# Patient Record
Sex: FEMALE | Race: WHITE | NOT HISPANIC OR LATINO
[De-identification: names, ages, dates, MRNs, and addresses within clinical notes are randomized per-mention and may not be internally consistent; named-entity substitution may affect disease eponyms.]

---

## 2020-09-23 PROBLEM — Z00.00 ENCOUNTER FOR PREVENTIVE HEALTH EXAMINATION: Status: ACTIVE | Noted: 2020-09-23

## 2020-10-07 ENCOUNTER — TRANSCRIPTION ENCOUNTER (OUTPATIENT)
Age: 30
End: 2020-10-07

## 2020-10-07 ENCOUNTER — APPOINTMENT (OUTPATIENT)
Dept: PLASTIC SURGERY | Facility: CLINIC | Age: 30
End: 2020-10-07
Payer: COMMERCIAL

## 2020-10-07 PROCEDURE — 99203 OFFICE O/P NEW LOW 30 MIN: CPT | Mod: 95

## 2020-10-12 NOTE — HISTORY OF PRESENT ILLNESS
[FreeTextEntry1] : 28 y/o nonbinary person DFaB (they/them, Jaclyn) presents for consultation for top surgery (subtotal mastestomy). They are interested in having a masculine chest contour. They do not plan to take any hormone treatments. They have not noticed any lumps or bumps ion the tissue. They have ____ family hx of breast cancer. They had an ultrasound of the breasts 10 years ago. Nipple sensation is not important to them. They have no plans to ever breast or chest feed.\par \par PMH: anxiety\par PSH: denies\par All: NKDA\par Meds: MVI, lorazepam prnm\par Soc: They work as a  for [employer]. They live with their partner, aCry, who will be available to help them after surgery. They quit smoking 10 years ago; some alcohol use. Denies recreationbal drug use.\par FH: parents alive, healthy

## 2020-10-12 NOTE — REVIEW OF SYSTEMS
[Anxiety] : anxiety [Depression] : depression [Negative] : Heme/Lymph [FreeTextEntry6] : h [FreeTextEntry7] : stomach issues, had colonoscopy 2018 and cleared

## 2020-10-12 NOTE — ASSESSMENT
[FreeTextEntry1] : Interested in DIFNG.\par Needs an in-person appt for exam and discussion.\par Review FH of breast cancer.\par Will obtain letter Friday.

## 2020-10-12 NOTE — PHYSICAL EXAM
[de-identified] : Head: NC/AT\par Eyes: sclerae clear, EOMI\par ENT: hearing grossly normal, no gross nasal deformity\par Resp: normal respiratory effort, no accessory muscle use\par MSK: normal gait and somewhat slouched posture\par Skin: no significant visible lesions\par Psych: normal affect, appropriate

## 2020-10-28 ENCOUNTER — APPOINTMENT (OUTPATIENT)
Dept: PLASTIC SURGERY | Facility: CLINIC | Age: 30
End: 2020-10-28
Payer: COMMERCIAL

## 2020-10-28 PROCEDURE — 99072 ADDL SUPL MATRL&STAF TM PHE: CPT

## 2020-10-28 PROCEDURE — 99213 OFFICE O/P EST LOW 20 MIN: CPT

## 2020-11-01 NOTE — PHYSICAL EXAM
[de-identified] : No dominant masses, skin changes, nipple retraction, or palpable axillary lymphadenopathy. SN->N distance is 21.5 cm B/L; width is 13 cm B/L; N->IMF is 6.5 cm on the right and 7 cm on the left. There is grade 1 ptosis. [de-identified] : Head: NC/AT\par Eyes: sclerae clear, EOMI\par ENT: hearing grossly normal, no gross auricular deformity\par Resp: normal respiratory effort, no accessory muscle use\par MSK: normal gait and posture\par Skin: multiple tattoos\par Psych: normal affect, appropriate

## 2020-11-01 NOTE — HISTORY OF PRESENT ILLNESS
[FreeTextEntry1] : Here for in-person follow-up for top surgery. They have no family history of breast cancer. They are interested in having scars that are "as straight as possible" to avoid too masculine a chest appearance. They are also interested in a flat chest contour. They want a free nipple graft technique. [place] but will go wherever if it means they can be done sooner.

## 2020-11-01 NOTE — ASSESSMENT
[FreeTextEntry1] : The patient is a good candidate for [facility] top surgery. They are looking to have scars that are as straight as possible. They have a letter of assessment from Haily Carlton LCSW. Will submit to insurance.

## 2020-12-18 ENCOUNTER — OUTPATIENT (OUTPATIENT)
Dept: OUTPATIENT SERVICES | Facility: HOSPITAL | Age: 30
LOS: 1 days | End: 2020-12-18
Payer: COMMERCIAL

## 2020-12-18 VITALS
DIASTOLIC BLOOD PRESSURE: 78 MMHG | OXYGEN SATURATION: 99 % | SYSTOLIC BLOOD PRESSURE: 112 MMHG | WEIGHT: 136.03 LBS | TEMPERATURE: 97 F | HEIGHT: 68 IN | HEART RATE: 83 BPM | RESPIRATION RATE: 18 BRPM

## 2020-12-18 DIAGNOSIS — F64.9 GENDER IDENTITY DISORDER, UNSPECIFIED: ICD-10-CM

## 2020-12-18 DIAGNOSIS — Z01.818 ENCOUNTER FOR OTHER PREPROCEDURAL EXAMINATION: ICD-10-CM

## 2020-12-18 PROCEDURE — 80048 BASIC METABOLIC PNL TOTAL CA: CPT

## 2020-12-18 PROCEDURE — 85027 COMPLETE CBC AUTOMATED: CPT

## 2020-12-18 PROCEDURE — G0463: CPT

## 2020-12-18 RX ORDER — CEFAZOLIN SODIUM 1 G
2000 VIAL (EA) INJECTION ONCE
Refills: 0 | Status: DISCONTINUED | OUTPATIENT
Start: 2020-12-29 | End: 2021-01-12

## 2020-12-18 RX ORDER — LIDOCAINE HCL 20 MG/ML
0.2 VIAL (ML) INJECTION ONCE
Refills: 0 | Status: DISCONTINUED | OUTPATIENT
Start: 2020-12-29 | End: 2021-01-12

## 2020-12-18 RX ORDER — SODIUM CHLORIDE 9 MG/ML
3 INJECTION INTRAMUSCULAR; INTRAVENOUS; SUBCUTANEOUS EVERY 8 HOURS
Refills: 0 | Status: DISCONTINUED | OUTPATIENT
Start: 2020-12-29 | End: 2021-01-12

## 2020-12-18 RX ORDER — CHLORHEXIDINE GLUCONATE 213 G/1000ML
1 SOLUTION TOPICAL ONCE
Refills: 0 | Status: DISCONTINUED | OUTPATIENT
Start: 2020-12-29 | End: 2021-01-12

## 2020-12-18 NOTE — H&P PST ADULT - NSICDXPROBLEM_GEN_ALL_CORE_FT
PROBLEM DIAGNOSES  Problem: Gender identity disorder, unspecified  Assessment and Plan: coming in for bilateral subtotal mastectomy with free nipple grafting

## 2020-12-18 NOTE — H&P PST ADULT - HISTORY OF PRESENT ILLNESS
30yr old female with gender identity disorder coming in for bilateral  subtotal mastectomy with free nipple grafting.    Note: covid test Pt will schedule 12/26/20-12/27 30yr old [employer] person with gender dysphoria coming in for bilateral  subtotal mastectomy with free nipple grafting.    Note: covid test Pt will schedule 12/26/20-12/27

## 2020-12-19 PROBLEM — F41.9 ANXIETY DISORDER, UNSPECIFIED: Chronic | Status: ACTIVE | Noted: 2020-12-18

## 2020-12-23 PROBLEM — J18.9 PNEUMONIA, UNSPECIFIED ORGANISM: Chronic | Status: ACTIVE | Noted: 2020-12-18

## 2020-12-26 ENCOUNTER — APPOINTMENT (OUTPATIENT)
Dept: DISASTER EMERGENCY | Facility: CLINIC | Age: 30
End: 2020-12-26

## 2020-12-26 DIAGNOSIS — Z01.818 ENCOUNTER FOR OTHER PREPROCEDURAL EXAMINATION: ICD-10-CM

## 2020-12-27 LAB — SARS-COV-2 N GENE NPH QL NAA+PROBE: NOT DETECTED

## 2020-12-28 ENCOUNTER — TRANSCRIPTION ENCOUNTER (OUTPATIENT)
Age: 30
End: 2020-12-28

## 2020-12-28 RX ORDER — ONDANSETRON 8 MG/1
4 TABLET, FILM COATED ORAL ONCE
Refills: 0 | Status: DISCONTINUED | OUTPATIENT
Start: 2020-12-29 | End: 2021-01-12

## 2020-12-28 RX ORDER — HYDROMORPHONE HYDROCHLORIDE 2 MG/ML
0.25 INJECTION INTRAMUSCULAR; INTRAVENOUS; SUBCUTANEOUS
Refills: 0 | Status: DISCONTINUED | OUTPATIENT
Start: 2020-12-29 | End: 2020-12-29

## 2020-12-28 RX ORDER — SODIUM CHLORIDE 9 MG/ML
1000 INJECTION, SOLUTION INTRAVENOUS
Refills: 0 | Status: DISCONTINUED | OUTPATIENT
Start: 2020-12-29 | End: 2021-01-12

## 2020-12-28 RX ORDER — OXYCODONE HYDROCHLORIDE 5 MG/1
5 TABLET ORAL ONCE
Refills: 0 | Status: DISCONTINUED | OUTPATIENT
Start: 2020-12-29 | End: 2020-12-29

## 2020-12-29 ENCOUNTER — APPOINTMENT (OUTPATIENT)
Dept: PLASTIC SURGERY | Facility: HOSPITAL | Age: 30
End: 2020-12-29
Payer: COMMERCIAL

## 2020-12-29 ENCOUNTER — OUTPATIENT (OUTPATIENT)
Dept: OUTPATIENT SERVICES | Facility: HOSPITAL | Age: 30
LOS: 1 days | End: 2020-12-29
Payer: COMMERCIAL

## 2020-12-29 ENCOUNTER — RESULT REVIEW (OUTPATIENT)
Age: 30
End: 2020-12-29

## 2020-12-29 VITALS
RESPIRATION RATE: 16 BRPM | DIASTOLIC BLOOD PRESSURE: 83 MMHG | OXYGEN SATURATION: 100 % | TEMPERATURE: 97 F | HEART RATE: 71 BPM | WEIGHT: 136.03 LBS | SYSTOLIC BLOOD PRESSURE: 128 MMHG | HEIGHT: 68 IN

## 2020-12-29 VITALS
DIASTOLIC BLOOD PRESSURE: 60 MMHG | HEART RATE: 94 BPM | TEMPERATURE: 98 F | SYSTOLIC BLOOD PRESSURE: 109 MMHG | RESPIRATION RATE: 14 BRPM | OXYGEN SATURATION: 97 %

## 2020-12-29 DIAGNOSIS — F64.9 GENDER IDENTITY DISORDER, UNSPECIFIED: ICD-10-CM

## 2020-12-29 PROCEDURE — 19350 NIPPLE/AREOLA RECONSTRUCTION: CPT | Mod: 50

## 2020-12-29 PROCEDURE — 88305 TISSUE EXAM BY PATHOLOGIST: CPT

## 2020-12-29 PROCEDURE — C9399: CPT

## 2020-12-29 PROCEDURE — 88305 TISSUE EXAM BY PATHOLOGIST: CPT | Mod: 26

## 2020-12-29 PROCEDURE — 19303 MAST SIMPLE COMPLETE: CPT | Mod: 50

## 2020-12-29 RX ORDER — MULTIVIT-MIN/FERROUS GLUCONATE 9 MG/15 ML
1 LIQUID (ML) ORAL
Qty: 0 | Refills: 0 | DISCHARGE

## 2020-12-29 RX ORDER — L.ACIDOPH/B.ANIMALIS/B.LONGUM 15B CELL
1 CAPSULE ORAL
Qty: 0 | Refills: 0 | DISCHARGE

## 2020-12-29 RX ORDER — OXYCODONE HYDROCHLORIDE 5 MG/1
1 TABLET ORAL
Qty: 8 | Refills: 0
Start: 2020-12-29

## 2020-12-29 RX ORDER — APREPITANT 80 MG/1
40 CAPSULE ORAL ONCE
Refills: 0 | Status: COMPLETED | OUTPATIENT
Start: 2020-12-29 | End: 2020-12-29

## 2020-12-29 RX ADMIN — APREPITANT 40 MILLIGRAM(S): 80 CAPSULE ORAL at 08:19

## 2020-12-29 NOTE — BRIEF OPERATIVE NOTE - NSICDXBRIEFPROCEDURE_GEN_ALL_CORE_FT
PROCEDURES:  Bilateral subtotal mastectomy 29-Dec-2020 15:09:40 with free nipple grafts Sam Brooks

## 2020-12-29 NOTE — ASU DISCHARGE PLAN (ADULT/PEDIATRIC) - NURSING INSTRUCTIONS
Next dose of Tylenol will be on or after _________4:15 PM__ ,today/tonight and every 6 hours afterwards as needed for pain management, do not take any Tylenol containing products until this time. Your first dose of Tylenol was given at _10:15 _AM_________. Do not exceed more than 4000mg of Tylenol in one 24 hour setting.  If no contraindications, you may alternate with Ibuprofen or Naproxen 3 hours after dose of Tylenol. Ibuprofen can be taken every 6 hours.

## 2020-12-29 NOTE — BRIEF OPERATIVE NOTE - OPERATION/FINDINGS
bilateral subtotal mastectomy with free nipple graft. bilateral GAGANDEEP drains. bilateral free nipple graft with bolster dressing.

## 2020-12-29 NOTE — ASU DISCHARGE PLAN (ADULT/PEDIATRIC) - ASU DC SPECIAL INSTRUCTIONSFT
NOTIFY YOUR SURGEON IF YOU HAVE: any bleeding that does not stop, any pus draining from your wound(s), any fever (over 100.4 F) persistent nausea/vomiting, or if your pain is not controlled on your discharge pain medications, unable to urinate.    ACTIVITY: Please refrain from increased physical activity for a period of 2 weeks. Please do not lift anything heavier than a gallon of milk or about 5 pounds. Upon follow up you will be given further instructions on how much physical activity you may do.     PAIN: Please take 1000mg tylenol around the clock every 6 hours for the next 48 hours. Please do not exceed 4000mg tylenol in any 24 hour period. If this does not control your pain please take an additional 400mg ibuprofen every 6 hours as needed. If both of these medications fail to control your pain please take a single 5mg oxycodone tablet every 6 hours as needed. This prescription has been sent to your home pharmacy for you to .     ANTIBIOTICS: not required / not indicated    DRESSINGS: Please leave all dressings on and in place until follow up. Please keep them dry and sponge bath.     DRAINS: You will be discharged with GAGANDEEP drains. You will need to empty them and record outputs accurately. This will be taught to you by the nursing staff. Please do not remove the GAGANDEEP drains. They will be removed in the office. Please bring to the office accurate records of output.     Please follow up with Dr. Das within 1 week of discharge from the hospital. You may call [phone] to schedule an appointment.     Please do not elevate your arms above the level of your shoulder or reach outward for items.

## 2020-12-29 NOTE — ASU DISCHARGE PLAN (ADULT/PEDIATRIC) - CARE PROVIDER_API CALL
Jayce Das)  Surgery  [facility]  [address], [address]  [place] [id number]  Phone: [phone]  Fax: [phone]  Follow Up Time: 1 week

## 2021-01-05 LAB — SURGICAL PATHOLOGY STUDY: SIGNIFICANT CHANGE UP

## 2021-01-06 ENCOUNTER — APPOINTMENT (OUTPATIENT)
Dept: PLASTIC SURGERY | Facility: CLINIC | Age: 31
End: 2021-01-06
Payer: COMMERCIAL

## 2021-01-06 PROCEDURE — 99024 POSTOP FOLLOW-UP VISIT: CPT

## 2021-01-06 RX ORDER — OXYCODONE 5 MG/1
5 TABLET ORAL
Qty: 10 | Refills: 0 | Status: ACTIVE | COMMUNITY
Start: 2021-01-06 | End: 1900-01-01

## 2021-01-09 NOTE — ASSESSMENT
[FreeTextEntry1] : Healing well. No evidence of infection or collection. Pt requesting additional Rx for pain medication. Reviewed recommended regimen of acetaminophen and ibuprofen. Reviewed wound care. RTO 1 week. If unable to return in person, telehealth ok, unless intervention required.

## 2021-01-09 NOTE — PHYSICAL EXAM
[de-identified] : Dressings and bolsters removed. Good symmetry. NACs adherent. Drains removed completely.

## 2021-01-09 NOTE — REASON FOR VISIT
[Post Op: _________] : a [unfilled] post op visit [FreeTextEntry1] : DOS 12/29/2020 s/p top surgery. Pt states they are doing well, denies any complaints.

## 2021-01-09 NOTE — HISTORY OF PRESENT ILLNESS
[FreeTextEntry1] : No acute issues. Had difficulty sleeping because of pain. Drain output < 20 cc/day. No F/C/S

## 2021-01-11 ENCOUNTER — APPOINTMENT (OUTPATIENT)
Dept: PLASTIC SURGERY | Facility: CLINIC | Age: 31
End: 2021-01-11
Payer: COMMERCIAL

## 2021-01-11 PROCEDURE — 99024 POSTOP FOLLOW-UP VISIT: CPT

## 2021-01-11 NOTE — ASSESSMENT
[FreeTextEntry1] : Continue wound care as discussed. 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

## 2021-01-11 NOTE — HISTORY OF PRESENT ILLNESS
[FreeTextEntry1] : Pain improving. Concerned re: swelling at bilateral lateral ends of incisions. Continuing wound care as discussed. Out of work until 1/18/2021, needs paperwork.\par \par Path demonstrated partially myxoid fibroadenomata.

## 2021-01-11 NOTE — PHYSICAL EXAM
[de-identified] : incisions intact without erythema. mild fullness at bilateral lateral aspects of incisions, c/w small dogears. NACs adherent. [de-identified] : poor posture

## 2021-02-01 ENCOUNTER — APPOINTMENT (OUTPATIENT)
Dept: PLASTIC SURGERY | Facility: CLINIC | Age: 31
End: 2021-02-01
Payer: COMMERCIAL

## 2021-02-01 PROCEDURE — 99024 POSTOP FOLLOW-UP VISIT: CPT

## 2021-02-01 NOTE — HISTORY OF PRESENT ILLNESS
[FreeTextEntry1] : Pt  returns for telehealth follow-up for top surgery. Feeling better. Lateral incision areas are bothering them less. Pleased with healing.\par \par Has appt with genetics at end of February.

## 2021-02-01 NOTE — PHYSICAL EXAM
[de-identified] : Good symmetry. NACs appear mostly epithelialized. Less prominence of lateral incisions. No visible areas of fullness or induration.

## 2021-02-26 ENCOUNTER — APPOINTMENT (OUTPATIENT)
Dept: PEDIATRIC MEDICAL GENETICS | Facility: CLINIC | Age: 31
End: 2021-02-26
Payer: COMMERCIAL

## 2021-02-26 PROCEDURE — 99202 OFFICE O/P NEW SF 15 MIN: CPT | Mod: 95

## 2021-03-02 NOTE — CONSULT LETTER
[Dear  ___] : Dear  [unfilled], [Consult Letter:] : I had the pleasure of evaluating your patient, [unfilled]. [Please see my note below.] : Please see my note below. [Sincerely,] : Sincerely, [FreeTextEntry3] : Enrique Villatoro MD\par

## 2021-03-09 ENCOUNTER — APPOINTMENT (OUTPATIENT)
Dept: PLASTIC SURGERY | Facility: CLINIC | Age: 31
End: 2021-03-09
Payer: COMMERCIAL

## 2021-03-09 PROCEDURE — 99024 POSTOP FOLLOW-UP VISIT: CPT

## 2021-03-09 NOTE — PHYSICAL EXAM
[de-identified] : Good symmetry. left NAC a bit taller than right. ? fullness on left. very small dogears B/L.

## 2021-03-09 NOTE — HISTORY OF PRESENT ILLNESS
[FreeTextEntry1] : Concerned re: fullness on left, "angriness of left incision." Also has some RoM concerns with left shoulder.\par

## 2021-03-09 NOTE — ASSESSMENT
[FreeTextEntry1] : Concern re: seroma on left. Moderate urgency at this point. Pt unable to attend available office hours before next week. Will refer for U/S of left chest wall to eval for collection.\par \par Other issues, NAC asymmetry, dogears can be addressed at 6+ months.\par \par Continue desensitization. Will reach out to orthopedic colleagues for PT referrals in [facility] for left shoulder.

## 2021-03-18 ENCOUNTER — APPOINTMENT (OUTPATIENT)
Dept: PLASTIC SURGERY | Facility: CLINIC | Age: 31
End: 2021-03-18
Payer: COMMERCIAL

## 2021-03-18 VITALS — BODY MASS INDEX: 20.92 KG/M2 | HEIGHT: 68 IN | WEIGHT: 138 LBS | RESPIRATION RATE: 16 BRPM

## 2021-03-18 PROCEDURE — 99024 POSTOP FOLLOW-UP VISIT: CPT

## 2021-03-19 ENCOUNTER — NON-APPOINTMENT (OUTPATIENT)
Age: 31
End: 2021-03-19

## 2021-03-19 NOTE — PHYSICAL EXAM
[de-identified] : Good symmetry. slight fullness above incision on left. U/S exam performed at bedside reveals no fluid. Small lateral dogears with no significant mass

## 2021-03-19 NOTE — HISTORY OF PRESENT ILLNESS
[FreeTextEntry1] : ~10 weeks out from top surgery. Has specific concerns about slight fullness above incision on left. US showed no collections. Also c/o small lateral dogears and some lateral chest wall discomfort. Has been massaging areas.

## 2021-05-14 ENCOUNTER — APPOINTMENT (OUTPATIENT)
Dept: PEDIATRIC MEDICAL GENETICS | Facility: CLINIC | Age: 31
End: 2021-05-14
Payer: COMMERCIAL

## 2021-05-14 PROCEDURE — 99202 OFFICE O/P NEW SF 15 MIN: CPT | Mod: 95

## 2021-05-17 NOTE — REASON FOR VISIT
[Home] : at home, [unfilled] , at the time of the visit. [Medical Office: ([facility])___] : at the medical office located in  [Other:____] : [unfilled] [Verbal consent obtained from patient] : the patient, [unfilled]

## 2021-09-02 ENCOUNTER — APPOINTMENT (OUTPATIENT)
Dept: PLASTIC SURGERY | Facility: CLINIC | Age: 31
End: 2021-09-02

## 2021-09-16 ENCOUNTER — APPOINTMENT (OUTPATIENT)
Dept: PLASTIC SURGERY | Facility: CLINIC | Age: 31
End: 2021-09-16
Payer: COMMERCIAL

## 2021-09-16 VITALS
HEART RATE: 84 BPM | SYSTOLIC BLOOD PRESSURE: 110 MMHG | BODY MASS INDEX: 19.25 KG/M2 | HEIGHT: 68 IN | TEMPERATURE: 97.8 F | OXYGEN SATURATION: 99 % | DIASTOLIC BLOOD PRESSURE: 73 MMHG | WEIGHT: 127 LBS | RESPIRATION RATE: 16 BRPM

## 2021-09-16 PROCEDURE — 99213 OFFICE O/P EST LOW 20 MIN: CPT

## 2021-09-16 NOTE — PHYSICAL EXAM
[de-identified] : Good overall symmetry.  NACs well-healed.  No significant areas of fullness or fluctuance.  Small bilateral lateral dogears approximately 2.5 to 3 cm in length.

## 2021-09-16 NOTE — HISTORY OF PRESENT ILLNESS
[FreeTextEntry1] : The patient returns for discussion of excision of bilateral lateral dogears.  They are otherwise very happy with their result.  However, they have persistent discomfort and distraction from the small areas of projected tissue at the lateral aspects of both transverse incisions.

## 2021-09-16 NOTE — ASSESSMENT
[FreeTextEntry1] : Plan for excision of bilateral lateral dogears in office.  We will attempt to do this at [facility] sometime in the next few weeks.

## 2021-10-07 ENCOUNTER — APPOINTMENT (OUTPATIENT)
Dept: PLASTIC SURGERY | Facility: CLINIC | Age: 31
End: 2021-10-07
Payer: COMMERCIAL

## 2021-10-07 VITALS
BODY MASS INDEX: 18.94 KG/M2 | OXYGEN SATURATION: 99 % | HEART RATE: 59 BPM | DIASTOLIC BLOOD PRESSURE: 78 MMHG | RESPIRATION RATE: 16 BRPM | TEMPERATURE: 97.4 F | SYSTOLIC BLOOD PRESSURE: 113 MMHG | HEIGHT: 68 IN | WEIGHT: 125 LBS

## 2021-10-07 PROCEDURE — 13101 CMPLX RPR TRUNK 2.6-7.5 CM: CPT

## 2021-10-07 PROCEDURE — 13102 CMPLX RPR TRUNK ADDL 5CM/<: CPT

## 2021-10-12 NOTE — PROCEDURE
[Nl] : None [FreeTextEntry1] : Bilateral lateral dogears following top surgery [FreeTextEntry2] : Excision of bilateral lateral dogears a layered repair 9 cm [FreeTextEntry6] : Risks, benefits, and alternatives to excision of bilateral lateral dogears were discussed with the patient. Specific risks of bleeding, infection, hematoma, seroma, recurrence, damage to nearby structures, permanent scarring, chronic pain, and need for additional procedures, among other risks, were discussed the patient and all questions were answered. The patient consented to the procedure.\par \par The bilateral chest was prepped with povidine iodine and local/digital block performed with 1% lidocaine with epinephrine buffered 5:1 with bicarbonate 8.6%. A total of 20 cc were infiltrated. \par \par Attention was turned to the right side.  It was reprepped with povidone iodine.  After anesthesia of the area was confirmed, a lentiform incision was made around the dogear.  The tissue, including some subcutaneous fat was excised.  The area was thoroughly cleansed with sterile saline and closed in layers with 4-0 Vicryl suture in the deep dermis and 5-0 Monocryl suture in subcuticular layer.  Total closure length was approximately 4 cm.  The wound was dressed with Mastisol, Steri-Strips, and a sterile dressing.\par \par Attention was then turned to the left side.  It was reprepped with povidone iodine.  After anesthesia of the area was confirmed, a lentiform incision was made around the dogear.  The tissue, including some subcutaneous fat was excised.  The area was thoroughly cleansed with sterile saline and closed in layers with 4-0 Vicryl suture in the deep dermis and 5-0 Monocryl suture in subcuticular layer.  Total closure length was approximately 5 cm.  The wound was dressed with Mastisol, Steri-Strips, and a sterile dressing.\par

## 2021-10-21 ENCOUNTER — APPOINTMENT (OUTPATIENT)
Dept: PLASTIC SURGERY | Facility: CLINIC | Age: 31
End: 2021-10-21
Payer: COMMERCIAL

## 2021-10-21 VITALS
RESPIRATION RATE: 16 BRPM | BODY MASS INDEX: 18.94 KG/M2 | WEIGHT: 125 LBS | DIASTOLIC BLOOD PRESSURE: 71 MMHG | OXYGEN SATURATION: 98 % | HEART RATE: 61 BPM | HEIGHT: 68 IN | SYSTOLIC BLOOD PRESSURE: 106 MMHG

## 2021-10-21 DIAGNOSIS — Z90.13 ACQUIRED ABSENCE OF BILATERAL BREASTS AND NIPPLES: ICD-10-CM

## 2021-10-21 DIAGNOSIS — F64.0 TRANSSEXUALISM: ICD-10-CM

## 2021-10-21 DIAGNOSIS — M79.89 OTHER SPECIFIED SOFT TISSUE DISORDERS: ICD-10-CM

## 2021-10-21 PROCEDURE — 99024 POSTOP FOLLOW-UP VISIT: CPT

## 2021-10-24 PROBLEM — F64.0 GENDER DYSPHORIA IN ADOLESCENT AND ADULT: Status: ACTIVE | Noted: 2020-10-12

## 2021-10-24 PROBLEM — M79.89 MASS OF SOFT TISSUE OF CHEST: Status: ACTIVE | Noted: 2021-03-09

## 2021-10-24 PROBLEM — Z90.13 STATUS POST BILATERAL MASTECTOMY: Status: ACTIVE | Noted: 2021-01-06

## 2021-10-24 NOTE — PHYSICAL EXAM
[de-identified] : Lateral scar revision sites intact without erythema or fullness.  Good contour.

## 2023-05-11 NOTE — ASU PATIENT PROFILE, ADULT - AS SC BRADEN FRICTION
(3) no apparent problem Cantharidin Pregnancy And Lactation Text: This medication has not been proven safe during pregnancy. It is unknown if this medication is excreted in breast milk.